# Patient Record
Sex: FEMALE | Race: WHITE | Employment: UNEMPLOYED | URBAN - METROPOLITAN AREA
[De-identification: names, ages, dates, MRNs, and addresses within clinical notes are randomized per-mention and may not be internally consistent; named-entity substitution may affect disease eponyms.]

---

## 2019-06-21 ENCOUNTER — OFFICE VISIT (OUTPATIENT)
Dept: INTERNAL MEDICINE CLINIC | Age: 44
End: 2019-06-21

## 2019-06-21 VITALS
DIASTOLIC BLOOD PRESSURE: 74 MMHG | SYSTOLIC BLOOD PRESSURE: 115 MMHG | BODY MASS INDEX: 23.7 KG/M2 | HEART RATE: 68 BPM | WEIGHT: 151 LBS | RESPIRATION RATE: 16 BRPM | TEMPERATURE: 98.2 F | HEIGHT: 67 IN | OXYGEN SATURATION: 100 %

## 2019-06-21 DIAGNOSIS — F41.9 ANXIETY AND DEPRESSION: ICD-10-CM

## 2019-06-21 DIAGNOSIS — F32.A ANXIETY AND DEPRESSION: ICD-10-CM

## 2019-06-21 DIAGNOSIS — L71.9 ACNE ROSACEA: ICD-10-CM

## 2019-06-21 DIAGNOSIS — Z85.820 HISTORY OF MELANOMA: ICD-10-CM

## 2019-06-21 DIAGNOSIS — Z00.00 ROUTINE ADULT HEALTH MAINTENANCE: Primary | ICD-10-CM

## 2019-06-21 RX ORDER — LAMOTRIGINE 100 MG/1
TABLET ORAL
Refills: 2 | COMMUNITY
Start: 2019-05-30 | End: 2020-08-28

## 2019-06-21 RX ORDER — CLINDAMYCIN PHOSPHATE 10 UG/ML
LOTION TOPICAL
Refills: 3 | COMMUNITY
Start: 2019-04-25 | End: 2022-09-20

## 2019-06-21 RX ORDER — TRETINOIN 0.25 MG/G
CREAM TOPICAL
Refills: 3 | COMMUNITY
Start: 2019-03-27 | End: 2022-09-20

## 2019-06-21 NOTE — PATIENT INSTRUCTIONS

## 2019-06-21 NOTE — PROGRESS NOTES
Identified pt with two pt identifiers(name and ). Reviewed record in preparation for visit and have obtained necessary documentation. Chief Complaint   Patient presents with   [address] Maintenance Due   Topic    DTaP/Tdap/Td series (1 - Tdap)    PAP AKA CERVICAL CYTOLOGY        Coordination of Care Questionnaire:   1) Have you been to an emergency room, urgent care, or hospitalized since your last visit? If yes, where when, and reason for visit? no       2. Have seen or consulted any other health care provider since your last visit? If yes, where when, and reason for visit? Yes, [facility]      3) Do you have an Advanced Directive/ Living Will in place? NO  If yes, do we have a copy on file NO  If no, would you like information NO    Patient is accompanied by self I have received verbal consent from Jessica Garland to discuss any/all medical information while they are present in the room.     Visit Vitals  /74 (BP 1 Location: Left arm, BP Patient Position: Sitting)   Pulse 68   Temp 98.2 °F (36.8 °C) (Oral)   Resp 16   Ht 5' 7\" (1.702 m)   Wt 151 lb (68.5 kg)   SpO2 100%   BMI 23.65 kg/m²

## 2019-06-21 NOTE — PROGRESS NOTES
Yoni Olmedo is a 40 y.o. female who presents for evaluation of cpe, npv. No recent pcp, but would follow with gyn annually. Doing well, no complaints. Has 3 kids at home, 15, 11,and 9.       ROS:  Constitutional: negative for fevers, chills, anorexia and weight loss  Eyes:   negative for visual disturbance and irritation  ENT:   negative for tinnitus,sore throat,nasal congestion,ear pain,hoarseness  Respiratory:  negative for cough, hemoptysis, dyspnea,wheezing  CV:   negative for chest pain, palpitations, lower extremity edema  GI:   negative for nausea, vomiting, diarrhea, abdominal pain,melena  Genitourinary: negative for frequency, dysuria and hematuria  Musculoskel: negative for myalgias, arthralgias, back pain, muscle weakness, joint pain  Neurological:  negative for headaches, dizziness, focal weakness, numbness  Psychiatric:     Negative for depression or anxiety      Past Medical History:   Diagnosis Date    Depression     Melanoma ([facility] [address]        Past Surgical History:   Procedure Laterality Date    VASCULAR SURGERY PROCEDURE UNLIST  2013       Family History   Problem Relation Age of Onset    Stroke Father     Heart Disease Father     Hypertension Father     Cancer Father         melanoma       Social History     Socioeconomic History    Marital status:      Spouse name: Not on file    Number of children: Not on file    Years of education: Not on file    Highest education level: Not on file   Occupational History    Not on file   Social Needs    Financial resource strain: Not on file    Food insecurity:     Worry: Not on file     Inability: Not on file    Transportation needs:     Medical: Not on file     Non-medical: Not on file   Tobacco Use    Smoking status: Never Smoker    Smokeless tobacco: Never Used   Substance and Sexual Activity    Alcohol use: Not Currently     Frequency: Never    Drug use: Never    Sexual activity: Yes     Partners: Male   Lifestyle    Physical activity:     Days per week: Not on file     Minutes per session: Not on file    Stress: Not on file   Relationships    Social connections:     Talks on phone: Not on file     Gets together: Not on file     Attends Mu-ism service: Not on file     Active member of club or organization: Not on file     Attends meetings of clubs or organizations: Not on file     Relationship status: Not on file    Intimate partner violence:     Fear of current or ex partner: Not on file     Emotionally abused: Not on file     Physically abused: Not on file     Forced sexual activity: Not on file   Other Topics Concern    Not on file   Social History Narrative    Not on file            Visit Vitals  /74 (BP 1 Location: Left arm, BP Patient Position: Sitting)   Pulse 68   Temp 98.2 °F (36.8 °C) (Oral)   Resp 16   Ht 5' 7\" (1.702 m)   Wt 151 lb (68.5 kg)   LMP 06/07/2019 (Exact Date)   SpO2 100%   BMI 23.65 kg/m²       Physical Examination:   General - Well appearing female  HEENT - PERRL, TM no erythema/opacification, normal nasal turbinates, no oropharyngeal erythema or exudate, MMM  Neck - supple, no bruits, no thyroidomegaly, no lymphadenopathy  Pulm - clear to auscultation bilaterally  Cardio - RRR, normal S1 S2, no murmur  Abd - soft, nontender, no masses, no HSM  Extrem - no edema, +2 distal pulses  Neuro-  No focal deficits, CN intact     Assessment/Plan:    1. Routine adult health maintenance--check cbc, cmp, flp, tsh, a1c, vit d  2. Hx melanoma--follows with derm annually  3. Acne rosacea--on tretinoin, prn clindamycin  4. Anxiety and depression--on lamictal now    [facility] rtc one year, sooner if labs abn.   No family hx or symptoms of colon cancer        Eve Mejía III, DO

## 2019-07-01 ENCOUNTER — TELEPHONE (OUTPATIENT)
Dept: INTERNAL MEDICINE CLINIC | Age: 44
End: 2019-07-01

## 2019-07-01 NOTE — TELEPHONE ENCOUNTER
Called, spoke to pt. Two identifiers confirmed. Contact information provided to pt for [facility]. Pt verbalized understanding of information discussed w/ no further questions at this time.

## 2019-07-02 LAB
25(OH)D3+25(OH)D2 SERPL-MCNC: 38.3 NG/ML (ref 30–100)
ALBUMIN SERPL-MCNC: 4.7 G/DL (ref 3.5–5.5)
ALBUMIN/GLOB SERPL: 2 {RATIO} (ref 1.2–2.2)
ALP SERPL-CCNC: 42 IU/L (ref 39–117)
ALT SERPL-CCNC: 13 IU/L (ref 0–32)
AST SERPL-CCNC: 16 IU/L (ref 0–40)
BASOPHILS # BLD AUTO: 0 X10E3/UL (ref 0–0.2)
BASOPHILS NFR BLD AUTO: 1 %
BILIRUB SERPL-MCNC: 0.8 MG/DL (ref 0–1.2)
BUN SERPL-MCNC: 13 MG/DL (ref 6–24)
BUN/CREAT SERPL: 13 (ref 9–23)
CALCIUM SERPL-MCNC: 9.4 MG/DL (ref 8.7–10.2)
CHLORIDE SERPL-SCNC: 104 MMOL/L (ref 96–106)
CHOLEST SERPL-MCNC: 188 MG/DL (ref 100–199)
CO2 SERPL-SCNC: 24 MMOL/L (ref 20–29)
CREAT SERPL-MCNC: 1.04 MG/DL (ref 0.57–1)
EOSINOPHIL # BLD AUTO: 0.2 X10E3/UL (ref 0–0.4)
EOSINOPHIL NFR BLD AUTO: 4 %
ERYTHROCYTE [DISTWIDTH] IN BLOOD BY AUTOMATED COUNT: 13.4 % (ref 12.3–15.4)
EST. AVERAGE GLUCOSE BLD GHB EST-MCNC: 105 MG/DL
GLOBULIN SER CALC-MCNC: 2.4 G/DL (ref 1.5–4.5)
GLUCOSE SERPL-MCNC: 86 MG/DL (ref 65–99)
HBA1C MFR BLD: 5.3 % (ref 4.8–5.6)
HCT VFR BLD AUTO: 42.4 % (ref 34–46.6)
HDLC SERPL-MCNC: 50 MG/DL
HGB BLD-MCNC: 14.2 G/DL (ref 11.1–15.9)
IMM GRANULOCYTES # BLD AUTO: 0 X10E3/UL (ref 0–0.1)
IMM GRANULOCYTES NFR BLD AUTO: 0 %
LDLC SERPL CALC-MCNC: 123 MG/DL (ref 0–99)
LYMPHOCYTES # BLD AUTO: 2.1 X10E3/UL (ref 0.7–3.1)
LYMPHOCYTES NFR BLD AUTO: 37 %
MCH RBC QN AUTO: 31.1 PG (ref 26.6–33)
MCHC RBC AUTO-ENTMCNC: 33.5 G/DL (ref 31.5–35.7)
MCV RBC AUTO: 93 FL (ref 79–97)
MONOCYTES # BLD AUTO: 0.4 X10E3/UL (ref 0.1–0.9)
MONOCYTES NFR BLD AUTO: 8 %
NEUTROPHILS # BLD AUTO: 2.9 X10E3/UL (ref 1.4–7)
NEUTROPHILS NFR BLD AUTO: 50 %
PLATELET # BLD AUTO: 215 X10E3/UL (ref 150–450)
POTASSIUM SERPL-SCNC: 5.3 MMOL/L (ref 3.5–5.2)
PROT SERPL-MCNC: 7.1 G/DL (ref 6–8.5)
RBC # BLD AUTO: 4.56 X10E6/UL (ref 3.77–5.28)
SODIUM SERPL-SCNC: 140 MMOL/L (ref 134–144)
TRIGL SERPL-MCNC: 77 MG/DL (ref 0–149)
TSH SERPL DL<=0.005 MIU/L-ACNC: 1.69 UIU/ML (ref 0.45–4.5)
VLDLC SERPL CALC-MCNC: 15 MG/DL (ref 5–40)
WBC # BLD AUTO: 5.7 X10E3/UL (ref 3.4–10.8)

## 2019-07-09 ENCOUNTER — TELEPHONE (OUTPATIENT)
Dept: INTERNAL MEDICINE CLINIC | Age: 44
End: 2019-07-09

## 2019-07-09 NOTE — TELEPHONE ENCOUNTER
Called, spoke to pt. Two identifiers confirmed. Pt notified of results/recommendations per Dr. Agueda Dow. Pt verbalized understanding of information discussed w/ no further questions at this time.

## 2019-07-09 NOTE — TELEPHONE ENCOUNTER
Pt would like blood test results that were performed on 07/01. Best contact number ([phone].        Message received & copied from [facility]

## 2020-08-18 ENCOUNTER — TELEPHONE (OUTPATIENT)
Dept: INTERNAL MEDICINE CLINIC | Age: 45
End: 2020-08-18

## 2020-08-18 NOTE — TELEPHONE ENCOUNTER
Called, spoke to pt  Received two pt identifiers  Pt informed appt scheduled with incorrect provider. Pt informed will send message to Dr. Sukh Crocker lead nurse to return call to get reschelued cor her annual CPE. Pt verbalizes understanding of the instructions and has no further questions at this time.

## 2020-08-25 NOTE — PROGRESS NOTES
HISTORY OF PRESENT ILLNESS  Ninfa Mcdowell is a 39 y.o. female. HPI     Pt is here to establish care. This is an established visit completed with telemedicine was completed with video assist  the patient acknowledges and agrees to this method of visitation doxyme    BP was 115/74 at [facility] was 151 lbs at lov  She exercises regularly  She avoid high caloric beverages    Ordered labs     She sees Dr. Ilda Lim (derm) for hx of melanoma  She had melanoma removed    She sees Dr. Yary Bell (psych) for anxiety/depression  She had apt this am 8/28/20  She was having a lot of panic attacks and then he added trintellix which has helped   She would like a referral for another psych because her current one isn't covered by insurance     She wonders if there is something wrong with her brain/hormones that could contribute to her depression because she has many meds and they haven't worked - will look on labs     PMHx: anxiety/depression, melanoma    FMHx: grandmother may have had colon cancer    PSHx: vascular surgery for varicose veins, uterine ablation, tonsillectomy     SHx: 3 kids, , at home, doesn't smoke or drink alcohol    PREVENTIVE:  Colonoscopy: will send referral due to family history  Dexa: not yet needed  Mammo: [address] 2019  Pap: [address] [facility] 2019, will call and find out if she's due  Tdap: > 10 years ago, due ordered  Pneumovax: not yet needed  Shingrix: not yet needed  Flu shot: fall 2019, due fall 2020   Eye exam: 1-2 years ago, due reminded  Lipids: 7/19 123  A1c: 7/19 5.3      Patient Active Problem List    Diagnosis Date Noted    History of melanoma 06/21/2019    Anxiety and depression 06/21/2019    Acne rosacea 06/21/2019     Current Outpatient Medications   Medication Sig Dispense Refill    diph,pertuss,acel,,tetanus vac,PF, (ADACEL) 2 Lf-(2.5-5-3-5 mcg)-5Lf/0.5 mL syrg vaccine 0.5 mL by IntraMUSCular route once for 1 dose.  0.5 mL 0    clindamycin (CLEOCIN T) 1 % lotion APPLY TO ACNE ON FACE AS NEEDED FOR FLARES  3    tretinoin (RETIN-A) 0.025 % topical cream APPLY TO ENTIRE FACE 3 TO 7 TIMES A WEEK AT BEDTIME  3    Trintellix 20 mg tablet TAKE 1 TABLET BY MOUTH EVERY DAY       Past Surgical History:   Procedure Laterality Date    HX GYN  2012    uterine ablation    HX TONSILLECTOMY      adenoidectomy    VASCULAR SURGERY PROCEDURE UNLIST  2013      Lab Results   Component Value Date/Time    WBC 5.7 07/01/2019 08:06 AM    HGB 14.2 07/01/2019 08:06 AM    Hemoglobin (POC) 12.9 11/22/2013 10:56 AM    HCT 42.4 07/01/2019 08:06 AM    Hematocrit (POC) 38 11/22/2013 10:56 AM    PLATELET 760 64/87/8208 08:06 AM    MCV 93 07/01/2019 08:06 AM     Lab Results   Component Value Date/Time    Cholesterol, total 188 07/01/2019 08:06 AM    HDL Cholesterol 50 07/01/2019 08:06 AM    LDL, calculated 123 (H) 07/01/2019 08:06 AM    Triglyceride 77 07/01/2019 08:06 AM     Lab Results   Component Value Date/Time    GFR est non-AA 66 07/01/2019 08:06 AM    GFRNA, POC >60 11/22/2013 10:56 AM    GFR est AA 76 07/01/2019 08:06 AM    GFRAA, POC >60 11/22/2013 10:56 AM    Creatinine 1.04 (H) 07/01/2019 08:06 AM    Creatinine (POC) 0.9 11/22/2013 10:56 AM    BUN 13 07/01/2019 08:06 AM    BUN (POC) 18 11/22/2013 10:56 AM    Sodium 140 07/01/2019 08:06 AM    Sodium (POC) 140 11/22/2013 10:56 AM    Potassium 5.3 (H) 07/01/2019 08:06 AM    Potassium (POC) 4.5 11/22/2013 10:56 AM    Chloride 104 07/01/2019 08:06 AM    Chloride (POC) 105 11/22/2013 10:56 AM    CO2 24 07/01/2019 08:06 AM        Review of Systems   Respiratory: Negative for shortness of breath. Cardiovascular: Negative for chest pain. Physical Exam  Constitutional:       General: She is not in acute distress. Appearance: Normal appearance. She is not ill-appearing, toxic-appearing or diaphoretic. HENT:      Head: Normocephalic and atraumatic. Eyes:      General:         Right eye: No discharge. Left eye: No discharge. Conjunctiva/sclera: Conjunctivae normal.   Pulmonary:      Effort: No respiratory distress. Neurological:      General: No focal deficit present. Mental Status: She is alert and oriented to person, place, and time. Mental status is at baseline. Gait: Gait normal.   Psychiatric:         Mood and Affect: Mood normal.         Behavior: Behavior normal.         ASSESSMENT and PLAN    ICD-10-CM ICD-9-CM    1. History of melanoma        Z85.820 V10.82 REFERRAL TO GASTROENTEROLOGY   Up-to-date with dermatology   LIPID PANEL      METABOLIC PANEL, COMPREHENSIVE      CBC W/O DIFF      HEMOGLOBIN A1C WITH EAG      TSH 3RD GENERATION   2. Anxiety and depression  F41.9 300.00 REFERRAL TO GASTROENTEROLOGY    F32.9 311 LIPID PANEL   In the process of getting her medications adjusted through psychiatry will defer to psychiatry   METABOLIC PANEL, COMPREHENSIVE      CBC W/O DIFF      HEMOGLOBIN A1C WITH EAG      TSH 3RD GENERATION   3. Acne rosacea  L71.9 695.3 REFERRAL TO GASTROENTEROLOGY   Sees Derm   LIPID PANEL      METABOLIC PANEL, COMPREHENSIVE      CBC W/O DIFF      HEMOGLOBIN A1C WITH EAG      TSH 3RD GENERATION   4. Physical exam  Z00.00 V70.9 REFERRAL TO GASTROENTEROLOGY   Patient is 45 colonoscopy likely due due to grandmother with colon cancer due for blood work ordered flu shot in the fall order Tdap for her pelvic and mammogram are about due she will schedule eye exam every other year   LIPID PANEL      METABOLIC PANEL, COMPREHENSIVE      CBC W/O DIFF      HEMOGLOBIN A1C WITH EAG      TSH 3RD GENERATION      [facility] AND       Depression screen reviewed and positive (1) follows with psychiatry to saw him today       Scribed by Mike Spivey of [address] [place] [address] [address], as dictated by Dr. Janes Alejo. Current diagnosis and concerns discussed with pt at length. Pt understands risks and benefits or current treatment plan and medications, and accepts the treatment and medication with any possible risks.  Pt asks appropriate questions, which were answered. Pt was instructed to call with any concerns or problems. I have reviewed the note documented by the scribe. The services provided are my own. The documentation is accurate     Yakov Nixa, who was evaluated through a synchronous (real-time) audio-video encounter, and/or her healthcare decision maker, is aware that it is a billable service, with coverage as determined by her insurance carrier. She provided verbal consent to proceed: Yes, and patient identification was verified. It was conducted pursuant to the emergency declaration under the [address] [place] [address], [address] [place] [address] authority and the [employer] Act. A caregiver was present when appropriate. Ability to conduct physical exam was limited. I was at home. The patient was at home.

## 2020-08-28 ENCOUNTER — VIRTUAL VISIT (OUTPATIENT)
Dept: INTERNAL MEDICINE CLINIC | Age: 45
End: 2020-08-28
Payer: COMMERCIAL

## 2020-08-28 DIAGNOSIS — F41.9 ANXIETY AND DEPRESSION: ICD-10-CM

## 2020-08-28 DIAGNOSIS — Z85.820 HISTORY OF MELANOMA: Primary | ICD-10-CM

## 2020-08-28 DIAGNOSIS — L71.9 ACNE ROSACEA: ICD-10-CM

## 2020-08-28 DIAGNOSIS — F32.A ANXIETY AND DEPRESSION: ICD-10-CM

## 2020-08-28 DIAGNOSIS — Z00.00 PHYSICAL EXAM: ICD-10-CM

## 2020-08-28 PROCEDURE — 99213 OFFICE O/P EST LOW 20 MIN: CPT | Performed by: INTERNAL MEDICINE

## 2020-08-28 RX ORDER — VORTIOXETINE 20 MG/1
TABLET, FILM COATED ORAL
COMMUNITY
Start: 2020-07-30 | End: 2022-09-20

## 2020-08-28 NOTE — PATIENT INSTRUCTIONS
Medicare Wellness Visit, Female The best way to live healthy is to have a lifestyle where you eat a well-balanced diet, exercise regularly, limit alcohol use, and quit all forms of tobacco/nicotine, if applicable. Regular preventive services are another way to keep healthy. Preventive services (vaccines, screening tests, monitoring & exams) can help personalize your care plan, which helps you manage your own care. Screening tests can find health problems at the earliest stages, when they are easiest to treat. [facility] follows the current, evidence-based guidelines published by the [facility] Garret Pickering ([facility]) when recommending preventive services for our patients. Because we follow these guidelines, sometimes recommendations change over time as research supports it. (For example, mammograms used to be recommended annually. Even though Medicare will still pay for an annual mammogram, the newer guidelines recommend a mammogram every two years for women of average risk). Of course, you and your doctor may decide to screen more often for some diseases, based on your risk and your co-morbidities (chronic disease you are already diagnosed with). Preventive services for you include: - Medicare offers their members a free annual wellness visit, which is time for you and your primary care provider to discuss and plan for your preventive service needs. Take advantage of this benefit every year! 
-All adults over the age of 72 should receive the recommended pneumonia vaccines. Current USPSTF guidelines recommend a series of two vaccines for the best pneumonia protection.  
-All adults should have a flu vaccine yearly and a tetanus vaccine every 10 years.  
-All adults age 48 and older should receive the shingles vaccines (series of two vaccines). -All adults age 38-68 who are overweight should have a diabetes screening test once every three years. -All adults born between [birth year] and 1965 should be screened once for Hepatitis C. 
-Other screening tests and preventive services for persons with diabetes include: an eye exam to screen for diabetic retinopathy, a kidney function test, a foot exam, and stricter control over your cholesterol.  
-Cardiovascular screening for adults with routine risk involves an electrocardiogram (ECG) at intervals determined by your doctor.  
-Colorectal cancer screenings should be done for adults age 54-65 with no increased risk factors for colorectal cancer. There are a number of acceptable methods of screening for this type of cancer. Each test has its own benefits and drawbacks. Discuss with your doctor what is most appropriate for you during your annual wellness visit. The different tests include: colonoscopy (considered the best screening method), a fecal occult blood test, a fecal DNA test, and sigmoidoscopy. 
 
-A bone mass density test is recommended when a woman turns 65 to screen for osteoporosis. This test is only recommended one time, as a screening. Some providers will use this same test as a disease monitoring tool if you already have osteoporosis. -Breast cancer screenings are recommended every other year for women of normal risk, age 54-69. 
-Cervical cancer screenings for women over age 72 are only recommended with certain risk factors. Here is a list of your current Health Maintenance items (your personalized list of preventive services) with a due date: 
Health Maintenance Due Topic Date Due  
 DTaP/Tdap/Td  (1 - Tdap) 04/30/1996  Pap Test  04/30/1996

## 2020-09-16 ENCOUNTER — TELEPHONE (OUTPATIENT)
Dept: INTERNAL MEDICINE CLINIC | Age: 45
End: 2020-09-16

## 2020-09-16 NOTE — TELEPHONE ENCOUNTER
Caller's first and last name: pt.   Reason for call: Pt. states she received her lab order from the practice that had her personal information on it. Pt. states on the form her address, insurance , and spouse who's the insurance policy rodríguez address is wrong on the form. Pt. would like to know if her VV. appt. was billed to her insurance correctly.  Pt.  would like to know if she should just cross out the mistake on the order or will another order for labs be mailed to her again.    Callback required yes/no and why: yes   Best contact number(s): [phone]   Details to clarify the request: [facility] was on file but she is with Sarah Dennis *updated pt.'s insurance*

## 2020-09-18 NOTE — TELEPHONE ENCOUNTER
----- Message from Diana Muro sent at 9/18/2020  3:31 PM EDT -----  Regarding: Mayte Tenorio/telephone  Patient returned a phone call from the practice. Pt had a physical with the doctor and some of the information is incorrect.  Best contact number is [phone]   Message copied/pasted from [facility]

## 2020-09-23 NOTE — TELEPHONE ENCOUNTER
Called out and spoke to pt. Two pt identifiers confirmed. Informed pt per PSR LH that pt's updated insurance is up to date and her previous ([employer]) insurance is not listed. Pt verbalized understanding of information discussed w/ no further questions at this time.

## 2020-09-23 NOTE — TELEPHONE ENCOUNTER
Called out and spoke to pt. Two pt identifiers confirmed. Pt states that her 8/28/20 lab orders and referral stated on them that the pt has Verlene Champagne" which is her old insurance. Pt states on the referral, it shows her  as \"primary\" and it has an old address. Pt has [employer]. Pt asking for verification for she does not to be billed incorrectly. Informed pt LPN RR will verify pt's insurance info.

## 2020-10-16 DIAGNOSIS — R79.89 LFT ELEVATION: Primary | ICD-10-CM

## 2020-10-16 LAB
ALBUMIN SERPL-MCNC: 4.7 G/DL (ref 3.8–4.8)
ALBUMIN/GLOB SERPL: 2 {RATIO} (ref 1.2–2.2)
ALP SERPL-CCNC: 56 IU/L (ref 39–117)
ALT SERPL-CCNC: 53 IU/L (ref 0–32)
AST SERPL-CCNC: 40 IU/L (ref 0–40)
BILIRUB SERPL-MCNC: 0.8 MG/DL (ref 0–1.2)
BUN SERPL-MCNC: 19 MG/DL (ref 6–24)
BUN/CREAT SERPL: 22 (ref 9–23)
CALCIUM SERPL-MCNC: 9.4 MG/DL (ref 8.7–10.2)
CHLORIDE SERPL-SCNC: 102 MMOL/L (ref 96–106)
CHOLEST SERPL-MCNC: 235 MG/DL (ref 100–199)
CO2 SERPL-SCNC: 23 MMOL/L (ref 20–29)
CREAT SERPL-MCNC: 0.87 MG/DL (ref 0.57–1)
ERYTHROCYTE [DISTWIDTH] IN BLOOD BY AUTOMATED COUNT: 12.1 % (ref 11.7–15.4)
EST. AVERAGE GLUCOSE BLD GHB EST-MCNC: 105 MG/DL
FSH SERPL-ACNC: 2.8 MIU/ML
GLOBULIN SER CALC-MCNC: 2.3 G/DL (ref 1.5–4.5)
GLUCOSE SERPL-MCNC: 86 MG/DL (ref 65–99)
HBA1C MFR BLD: 5.3 % (ref 4.8–5.6)
HCT VFR BLD AUTO: 40.5 % (ref 34–46.6)
HDLC SERPL-MCNC: 63 MG/DL
HGB BLD-MCNC: 13.9 G/DL (ref 11.1–15.9)
LDLC SERPL CALC-MCNC: 160 MG/DL (ref 0–99)
LH SERPL-ACNC: 2.8 MIU/ML
MCH RBC QN AUTO: 31 PG (ref 26.6–33)
MCHC RBC AUTO-ENTMCNC: 34.3 G/DL (ref 31.5–35.7)
MCV RBC AUTO: 90 FL (ref 79–97)
PLATELET # BLD AUTO: 215 X10E3/UL (ref 150–450)
POTASSIUM SERPL-SCNC: 4.7 MMOL/L (ref 3.5–5.2)
PROT SERPL-MCNC: 7 G/DL (ref 6–8.5)
RBC # BLD AUTO: 4.49 X10E6/UL (ref 3.77–5.28)
SODIUM SERPL-SCNC: 138 MMOL/L (ref 134–144)
TRIGL SERPL-MCNC: 69 MG/DL (ref 0–149)
TSH SERPL DL<=0.005 MIU/L-ACNC: 1.91 UIU/ML (ref 0.45–4.5)
VLDLC SERPL CALC-MCNC: 12 MG/DL (ref 5–40)
WBC # BLD AUTO: 6.7 X10E3/UL (ref 3.4–10.8)

## 2020-10-16 NOTE — PROGRESS NOTES
Called out and spoke to pt. Two pt identifiers confirmed. Pt informed per Dr. José Miguel Castorena the lipids are up and to work on diet. Pt informed per Dr. José Miguel Castorena of LFT's are up--may be fatty liver. Pt informed per Dr. José Miguel Castorena to repeat hepatic fxn panel and acute hepatitis panel in 4 weeks--Labs ordered and mailed to pt. Pt informed rest of labs stable. Pt verbalized understanding of information discussed w/ no further questions at this time.

## 2020-10-16 NOTE — PROGRESS NOTES
Please call patient back about results  Lipids up work on diet    lft up --may be fatty liver,     Repeat hepatic fxn panel and acute hepatitis panel in 4 weeks    Rest fine

## 2020-12-11 LAB
ALBUMIN SERPL-MCNC: 4.8 G/DL (ref 3.8–4.8)
ALP SERPL-CCNC: 53 IU/L (ref 39–117)
ALT SERPL-CCNC: 33 IU/L (ref 0–32)
AST SERPL-CCNC: 28 IU/L (ref 0–40)
BILIRUB DIRECT SERPL-MCNC: 0.14 MG/DL (ref 0–0.4)
BILIRUB SERPL-MCNC: 0.6 MG/DL (ref 0–1.2)
HAV IGM SERPL QL IA: NEGATIVE
HBV CORE IGM SERPL QL IA: NEGATIVE
HBV SURFACE AG SERPL QL IA: NEGATIVE
HCV AB S/CO SERPL IA: <0.1 S/CO RATIO (ref 0–0.9)
PROT SERPL-MCNC: 6.9 G/DL (ref 6–8.5)

## 2022-03-18 PROBLEM — L71.9 ACNE ROSACEA: Status: ACTIVE | Noted: 2019-06-21

## 2022-03-19 PROBLEM — Z85.820 HISTORY OF MELANOMA: Status: ACTIVE | Noted: 2019-06-21

## 2022-03-20 PROBLEM — F32.A ANXIETY AND DEPRESSION: Status: ACTIVE | Noted: 2019-06-21

## 2022-03-20 PROBLEM — F41.9 ANXIETY AND DEPRESSION: Status: ACTIVE | Noted: 2019-06-21

## 2022-09-01 ENCOUNTER — TELEPHONE (OUTPATIENT)
Dept: INTERNAL MEDICINE CLINIC | Age: 47
End: 2022-09-01

## 2022-09-01 NOTE — TELEPHONE ENCOUNTER
----- Message from InVitaeisai 2 sent at 9/1/2022 11:23 AM EDT -----  Subject: Appointment Request    Reason for Call: Established Patient Appointment needed: Semi-Routine Skin   Problem    QUESTIONS    Reason for appointment request? No appointments available during search     Additional Information for Provider?  Patients request appointment for   bumps on her elbows.   ---------------------------------------------------------------------------  --------------  Cari JONES  [phone]; OK to leave message on voicemail  ---------------------------------------------------------------------------  --------------  SCRIPT ANSWERS  COVID Screen: Maisha Lambert

## 2022-09-01 NOTE — TELEPHONE ENCOUNTER
Called and offered to schedule patient for a virtual visit on 9/9/22. Patient declined and requested in office. Patient has been scheduled for the next available appointment on 9/20/22.

## 2022-09-16 NOTE — PROGRESS NOTES
HISTORY OF PRESENT ILLNESS  Haritha Love is a 52 y.o. female. HPI  Last here vv 8/28/20. Here for routine care. Pt c/o bumps on both elbows. Notes she noticed the one on the L elbow over 1 year ago and the one on the R elbow sometime after that. On exam: Subcutaneous lipomas L and R elbows  Discussed these are likely benign, but if they get bigger she can get them removed    Pt notes an episode while driving back from her daughter's soccer game when she suddenly felt very nauseous and sweaty. She came home and laid down and felt okay after that. She also notes being fatigued and having a headache over the weekend. States she had low iron the last time she tried to give blood. Will check iron levels today. Reminded pt to complete colo. BP today controlled     Wt today is 209 lbs, up 48 lbs since lov  States she is unsure how this happened, she has eaten less and exercised more  Discussed [employer], [address] wt/l [facility], also discussed that cymbalta has weight gain as a side effect     Reviewed labs  Ordered labs   Discussed she may need a chol med soon  Discussed elevated liver test  She states she only drinks occasionally.  Discussed this could have been due to weight gain     She sees Dr. Stefania Bains (derm) for hx of melanoma  Last visit 2/22, follows annually  She had melanoma removed     She sees Dr. Marce Workman (psych) for anxiety/depression  Last virtual visit 6/22, f/U every 6 months  Now taking cymbalta 30 mg   No longer taking trintellex  Has an rx for xanax, but doesn't use it much (once/month at the most)     PREVENTIVE:  Colonoscopy: sent referral due to family history, still due, reminded  Dexa: not yet needed  Mammo: [address] [facility] 3/22, will get report  Pap: [address] [facility] 3/2/22  Tdap: > 10 years ago, will get today 9/20/22  Pneumovax: not yet needed  Shingrix: not yet needed  Flu shot: fall 2021  Eye exam: 1-2 years ago, due reminded again  Hep C: 12/20 neg  Lipids: 8/20   A1c: 8/20 5.3  COVID: 2 doses Pfizer, last one 5/21       Patient Active Problem List    Diagnosis Date Noted    History of melanoma 06/21/2019    Anxiety and depression 06/21/2019    Acne rosacea 06/21/2019     Current Outpatient Medications   Medication Sig Dispense Refill    Trintellix 20 mg tablet TAKE 1 TABLET BY MOUTH EVERY DAY      clindamycin (CLEOCIN T) 1 % lotion APPLY TO ACNE ON FACE AS NEEDED FOR FLARES  3    tretinoin (RETIN-A) 0.025 % topical cream APPLY TO ENTIRE FACE 3 TO 7 TIMES A WEEK AT BEDTIME  3     Past Surgical History:   Procedure Laterality Date    HX GYN  2012    uterine ablation    HX TONSILLECTOMY      adenoidectomy    VASCULAR SURGERY PROCEDURE UNLIST  2013      Lab Results   Component Value Date/Time    WBC 6.7 10/15/2020 09:32 AM    HGB 13.9 10/15/2020 09:32 AM    Hemoglobin (POC) 12.9 11/22/2013 10:56 AM    HCT 40.5 10/15/2020 09:32 AM    Hematocrit (POC) 38 11/22/2013 10:56 AM    PLATELET 901 80/26/8603 09:32 AM    MCV 90 10/15/2020 09:32 AM     Lab Results   Component Value Date/Time    Cholesterol, total 235 (H) 10/15/2020 09:32 AM    HDL Cholesterol 63 10/15/2020 09:32 AM    LDL, calculated 160 (H) 10/15/2020 09:32 AM    LDL, calculated 123 (H) 07/01/2019 08:06 AM    Triglyceride 69 10/15/2020 09:32 AM     Lab Results   Component Value Date/Time    GFR est non-AA 81 10/15/2020 09:32 AM    GFRNA, POC >60 11/22/2013 10:56 AM    GFR est AA 93 10/15/2020 09:32 AM    GFRAA, POC >60 11/22/2013 10:56 AM    Creatinine 0.87 10/15/2020 09:32 AM    Creatinine (POC) 0.9 11/22/2013 10:56 AM    BUN 19 10/15/2020 09:32 AM    BUN (POC) 18 11/22/2013 10:56 AM    Sodium 138 10/15/2020 09:32 AM    Sodium (POC) 140 11/22/2013 10:56 AM    Potassium 4.7 10/15/2020 09:32 AM    Potassium (POC) 4.5 11/22/2013 10:56 AM    Chloride 102 10/15/2020 09:32 AM    Chloride (POC) 105 11/22/2013 10:56 AM    CO2 23 10/15/2020 09:32 AM        Review of Systems   Respiratory:  Negative for shortness of breath.     Cardiovascular:  Negative for chest pain. Physical Exam  Constitutional:       General: She is not in acute distress. Appearance: She is not ill-appearing, toxic-appearing or diaphoretic. HENT:      Head: Normocephalic and atraumatic. Right Ear: External ear normal.      Left Ear: External ear normal.   Eyes:      General:         Right eye: No discharge. Left eye: No discharge. Conjunctiva/sclera: Conjunctivae normal.      Pupils: Pupils are equal, round, and reactive to light. Neck:      Vascular: No carotid bruit. Cardiovascular:      Rate and Rhythm: Normal rate and regular rhythm. Heart sounds: No murmur heard. No friction rub. No gallop. Pulmonary:      Effort: No respiratory distress. Breath sounds: Normal breath sounds. No wheezing or rales. Chest:      Chest wall: No tenderness. Musculoskeletal:         General: Normal range of motion. Cervical back: Normal.   Skin:     General: Skin is warm and dry. Comments: Subcutaneous lipomas L and R elbows   Neurological:      Mental Status: She is oriented to person, place, and time. Mental status is at baseline. Coordination: Coordination normal.      Gait: Gait normal.   Psychiatric:         Mood and Affect: Mood normal.         Behavior: Behavior normal.       ASSESSMENT and PLAN    ICD-10-CM ICD-9-CM    1.  Physical exam  Z00.00 V70.9 REFERRAL TO GASTROENTEROLOGY      LIPID PANEL   Tdap today    Will get flu shot later in the season    Overdue for colonoscopy which I discussed    Pelvic and mammogram up-to-date will get reports for review    COVID vaccines completed 2 shots    She reports some low ferritin when giving blood we will check basic labs    Reports an episode of lightheadedness and like she was dehydrated that day we will keep an eye on it    Subcutaneous nodules appear to be benign lipomas discussed if they change in size start enlarging would evaluate further   METABOLIC PANEL, COMPREHENSIVE      HEMOGLOBIN A1C WITH EAG   Discussed diet and weight loss discussed weight loss programs to include weight watchers [address] weight and wellness   TSH 3RD GENERATION      CBC W/O DIFF      AVRIL COMPREHENSIVE PANEL      ACTIN (SMOOTH MUSCLE) ANTIBODY      REFERRAL TO OBSTETRICS AND GYNECOLOGY      FERRITIN      2. Anxiety and depression  F41.9 300.00 LIPID PANEL    F32. A 416 METABOLIC PANEL, COMPREHENSIVE      HEMOGLOBIN A1C WITH EAG   Controlled on Cymbalta follows with psychiatry   TSH 3RD GENERATION      CBC W/O DIFF      AVRIL COMPREHENSIVE PANEL      ACTIN (SMOOTH MUSCLE) ANTIBODY      REFERRAL TO OBSTETRICS AND GYNECOLOGY      3. History of melanoma  Z85.820 V10.82 LIPID PANEL      METABOLIC PANEL, COMPREHENSIVE   Follows routinely with dermatology   HEMOGLOBIN A1C WITH EAG      TSH 3RD GENERATION      CBC W/O DIFF      AVRIL COMPREHENSIVE PANEL      ACTIN (SMOOTH MUSCLE) ANTIBODY      REFERRAL TO OBSTETRICS AND GYNECOLOGY      4. LFT elevation  R79.89 790.6 LIPID PANEL      METABOLIC PANEL, COMPREHENSIVE      HEMOGLOBIN A1C WITH EAG   Patient with LFT elevation last check negative hepatitis panel likely related to fatty liver    Discussed    Work on weight loss    Check labs if still elevated will need ultrasound liver   TSH 3RD GENERATION      CBC W/O DIFF      AVRIL COMPREHENSIVE PANEL      ACTIN (SMOOTH MUSCLE) ANTIBODY      REFERRAL TO OBSTETRICS AND GYNECOLOGY        Depression screen reviewed and negative. Scribed by Arianna Long, as dictated by Dr. Saritha Acharya. Current diagnosis and concerns discussed with pt at length. Pt understands risks and benefits or current treatment plan and medications, and accepts the treatment and medication with any possible risks. Pt asks appropriate questions, which were answered. Pt was instructed to call with any concerns or problems. I have reviewed the note documented by the scribe. The services provided are my own.  The documentation is accurate.

## 2022-09-20 ENCOUNTER — OFFICE VISIT (OUTPATIENT)
Dept: INTERNAL MEDICINE CLINIC | Age: 47
End: 2022-09-20

## 2022-09-20 VITALS
WEIGHT: 209.4 LBS | HEIGHT: 67 IN | HEART RATE: 80 BPM | RESPIRATION RATE: 16 BRPM | OXYGEN SATURATION: 98 % | DIASTOLIC BLOOD PRESSURE: 80 MMHG | TEMPERATURE: 99 F | SYSTOLIC BLOOD PRESSURE: 113 MMHG | BODY MASS INDEX: 32.87 KG/M2

## 2022-09-20 DIAGNOSIS — F32.A ANXIETY AND DEPRESSION: ICD-10-CM

## 2022-09-20 DIAGNOSIS — Z85.820 HISTORY OF MELANOMA: ICD-10-CM

## 2022-09-20 DIAGNOSIS — Z23 ENCOUNTER FOR IMMUNIZATION: ICD-10-CM

## 2022-09-20 DIAGNOSIS — Z00.00 PHYSICAL EXAM: Primary | ICD-10-CM

## 2022-09-20 DIAGNOSIS — F41.9 ANXIETY AND DEPRESSION: ICD-10-CM

## 2022-09-20 DIAGNOSIS — R79.89 LFT ELEVATION: ICD-10-CM

## 2022-09-20 LAB
COMMENT, HOLDF: NORMAL
SAMPLES BEING HELD,HOLD: NORMAL

## 2022-09-20 PROCEDURE — 99396 PREV VISIT EST AGE 40-64: CPT | Performed by: INTERNAL MEDICINE

## 2022-09-20 PROCEDURE — 90715 TDAP VACCINE 7 YRS/> IM: CPT | Performed by: INTERNAL MEDICINE

## 2022-09-20 PROCEDURE — 90471 IMMUNIZATION ADMIN: CPT | Performed by: INTERNAL MEDICINE

## 2022-09-20 RX ORDER — DULOXETIN HYDROCHLORIDE 30 MG/1
30 CAPSULE, DELAYED RELEASE ORAL DAILY
COMMUNITY

## 2022-09-20 RX ORDER — ALPRAZOLAM 0.25 MG/1
TABLET ORAL
COMMUNITY

## 2022-09-20 NOTE — LETTER
9/22/2022 10:16 AM    Ms. Krystal Bhakta  [address] [facility] [id number]    Dear Care Provider    Please fax us the most recent mammogram and PAP results so that we may update the patient's records for continuity of care. Our fax number: [phone].     Patient:   Krystal Bhakta  1975          Sincerely,      Lázaro Spivey MD

## 2022-09-20 NOTE — LETTER
9/22/2022 10:21 AM    Ms. Versa Severin  [address] [id number]              Sincerely,      Sofi Vasquez MD

## 2022-09-22 DIAGNOSIS — R79.89 LFT ELEVATION: ICD-10-CM

## 2022-09-22 DIAGNOSIS — E78.5 ELEVATED LIPIDS: Primary | ICD-10-CM

## 2022-09-22 LAB
ALBUMIN SERPL-MCNC: 4.3 G/DL (ref 3.5–5)
ALBUMIN/GLOB SERPL: 1.4 {RATIO} (ref 1.1–2.2)
ALP SERPL-CCNC: 65 U/L (ref 45–117)
ALT SERPL-CCNC: 45 U/L (ref 12–78)
ANION GAP SERPL CALC-SCNC: 6 MMOL/L (ref 5–15)
AST SERPL-CCNC: 22 U/L (ref 15–37)
BILIRUB SERPL-MCNC: 0.6 MG/DL (ref 0.2–1)
BUN SERPL-MCNC: 14 MG/DL (ref 6–20)
BUN/CREAT SERPL: 15 (ref 12–20)
CALCIUM SERPL-MCNC: 8.7 MG/DL (ref 8.5–10.1)
CENTROMERE B AB SER-ACNC: <0.2 AI (ref 0–0.9)
CHLORIDE SERPL-SCNC: 104 MMOL/L (ref 97–108)
CHOLEST SERPL-MCNC: 255 MG/DL
CHROMATIN AB SERPL-ACNC: <0.2 AI (ref 0–0.9)
CO2 SERPL-SCNC: 28 MMOL/L (ref 21–32)
CREAT SERPL-MCNC: 0.95 MG/DL (ref 0.55–1.02)
DSDNA AB SER-ACNC: 1 IU/ML (ref 0–9)
ENA JO1 AB SER-ACNC: <0.2 AI (ref 0–0.9)
ENA RNP AB SER-ACNC: <0.2 AI (ref 0–0.9)
ENA SCL70 AB SER-ACNC: <0.2 AI (ref 0–0.9)
ENA SM AB SER-ACNC: <0.2 AI (ref 0–0.9)
ENA SS-A AB SER-ACNC: <0.2 AI (ref 0–0.9)
ENA SS-B AB SER-ACNC: <0.2 AI (ref 0–0.9)
ERYTHROCYTE [DISTWIDTH] IN BLOOD BY AUTOMATED COUNT: 12.7 % (ref 11.5–14.5)
EST. AVERAGE GLUCOSE BLD GHB EST-MCNC: 117 MG/DL
FERRITIN SERPL-MCNC: 18 NG/ML (ref 26–388)
GLOBULIN SER CALC-MCNC: 3.1 G/DL (ref 2–4)
GLUCOSE SERPL-MCNC: 92 MG/DL (ref 65–100)
HBA1C MFR BLD: 5.7 % (ref 4–5.6)
HCT VFR BLD AUTO: 44.7 % (ref 35–47)
HDLC SERPL-MCNC: 49 MG/DL
HDLC SERPL: 5.2 {RATIO} (ref 0–5)
HGB BLD-MCNC: 13.8 G/DL (ref 11.5–16)
LDLC SERPL CALC-MCNC: 170.2 MG/DL (ref 0–100)
MCH RBC QN AUTO: 29.6 PG (ref 26–34)
MCHC RBC AUTO-ENTMCNC: 30.9 G/DL (ref 30–36.5)
MCV RBC AUTO: 95.9 FL (ref 80–99)
NRBC # BLD: 0 K/UL (ref 0–0.01)
NRBC BLD-RTO: 0 PER 100 WBC
PLATELET # BLD AUTO: 257 K/UL (ref 150–400)
PMV BLD AUTO: 11.5 FL (ref 8.9–12.9)
POTASSIUM SERPL-SCNC: 4.4 MMOL/L (ref 3.5–5.1)
PROT SERPL-MCNC: 7.4 G/DL (ref 6.4–8.2)
RBC # BLD AUTO: 4.66 M/UL (ref 3.8–5.2)
SEE BELOW, 164869: NORMAL
SMA IGG SER-ACNC: 10 UNITS (ref 0–19)
SODIUM SERPL-SCNC: 138 MMOL/L (ref 136–145)
TRIGL SERPL-MCNC: 179 MG/DL (ref ?–150)
TSH SERPL DL<=0.05 MIU/L-ACNC: 2.61 UIU/ML (ref 0.36–3.74)
VLDLC SERPL CALC-MCNC: 35.8 MG/DL
WBC # BLD AUTO: 8 K/UL (ref 3.6–11)

## 2022-09-22 RX ORDER — ROSUVASTATIN CALCIUM 5 MG/1
5 TABLET, COATED ORAL
Qty: 90 TABLET | Refills: 0 | Status: SHIPPED | OUTPATIENT
Start: 2022-09-22

## 2022-09-22 NOTE — TELEPHONE ENCOUNTER
Future Appointments:  No future appointments. Last Appointment With Me:  9/20/2022     Requested Prescriptions     Pending Prescriptions Disp Refills    rosuvastatin (CRESTOR) 5 mg tablet 90 Tablet 0     Sig: Take 1 Tablet by mouth nightly.

## 2022-09-22 NOTE — PROGRESS NOTES
Called, spoke to pt  Received two pt identifiers   Pt informed per Dr. Chrissy Kelly is normal, no anemia, iron/ferritn a bit on lower side--can take otc iron 325mg every other day   Pt informed per Dr. Saritha Long Still needs colonoscopy due to family history and routine screening  Pt informed per Dr. Meggan Walker very high, suggest crestor 5mg daily in addition to wt loss (of note this is teratogenic--avoid pregnancy/stop med if became pregnant)---pended  Pt states had an ablation  Pt informed per Dr. Saritha Long  Repeat lipids and ck in 6 weeks---ordered  Pt informed per Dr. Saritha Long mild prediabetes on bloodwork,work on diet/weight loss to improve this, work on avoiding simple carbohydrates (\"whites\"--white bread, white rice, white pasta, etc.. ) to improve further, no medications needed for now will monitor bloodwork   Pt verbalizes understanding of the instructions and has no further questions at this time.

## 2022-09-22 NOTE — PROGRESS NOTES
Please call patient back about results  Cbc nl, no anemia, iron/ferritn a bit on lower side--can take otc iron 325mg every other day   Still needs colo d/t fhx and routine screening  Lipids very high, suggest crestor 5mg daily in addition to wt loss (of note this is teratogenic--avoid pregnancy/stop med if became pregnant)  Repeat lipids and ck in 6 weeks  the patient has mild prediabetes on bloodwork, have them work on diet/weight loss to improve this, work on avoiding simple carbohydrates (\"whites\"--white bread, white rice, white pasta, etc.. ) to improve further, no medications needed for now will monitor bloodwork

## 2022-12-19 RX ORDER — ROSUVASTATIN CALCIUM 5 MG/1
5 TABLET, COATED ORAL
Qty: 90 TABLET | Refills: 0 | Status: SHIPPED | OUTPATIENT
Start: 2022-12-19

## 2023-03-13 RX ORDER — ROSUVASTATIN CALCIUM 5 MG/1
5 TABLET, COATED ORAL
Qty: 90 TABLET | Refills: 0 | Status: SHIPPED | OUTPATIENT
Start: 2023-03-13

## 2023-05-31 LAB — MAMMOGRAPHY, EXTERNAL: NORMAL

## 2023-06-19 RX ORDER — ROSUVASTATIN CALCIUM 5 MG/1
TABLET, COATED ORAL NIGHTLY
Qty: 30 TABLET | Refills: 0 | Status: SHIPPED | OUTPATIENT
Start: 2023-06-19 | End: 2023-07-20 | Stop reason: SDUPTHER

## 2023-07-20 RX ORDER — ROSUVASTATIN CALCIUM 5 MG/1
5 TABLET, COATED ORAL NIGHTLY
Qty: 30 TABLET | Refills: 0 | Status: SHIPPED | OUTPATIENT
Start: 2023-07-20

## 2023-07-20 NOTE — TELEPHONE ENCOUNTER
PCP: Santiago Last MD    Last appt:   9/20/2022    Future Appointments   Date Time Provider [address]   9/18/2023  1:00 PM Meli Love MD [facility] BS AMB       Requested Prescriptions     Pending Prescriptions Disp Refills    rosuvastatin (CRESTOR) 5 MG tablet 30 tablet 0     Sig: Take 1 tablet by mouth nightly

## 2023-08-17 RX ORDER — ROSUVASTATIN CALCIUM 5 MG/1
5 TABLET, COATED ORAL NIGHTLY
Qty: 90 TABLET | Refills: 0 | Status: SHIPPED | OUTPATIENT
Start: 2023-08-17

## 2023-08-17 NOTE — TELEPHONE ENCOUNTER
PCP: Makenna Briseno MD    Last appt: 9/20/2022   Future Appointments   Date Time Provider [address]   9/18/2023  1:00 PM Camacho Churchill MD [facility] BS AMB       Requested Prescriptions     Pending Prescriptions Disp Refills    rosuvastatin (CRESTOR) 5 MG tablet 90 tablet 0     Sig: Take 1 tablet by mouth nightly

## 2024-11-06 ENCOUNTER — OFFICE VISIT (OUTPATIENT)
Age: 49
End: 2024-11-06
Payer: COMMERCIAL

## 2024-11-06 VITALS
OXYGEN SATURATION: 100 % | SYSTOLIC BLOOD PRESSURE: 96 MMHG | HEART RATE: 76 BPM | TEMPERATURE: 97.6 F | BODY MASS INDEX: 24.8 KG/M2 | RESPIRATION RATE: 18 BRPM | WEIGHT: 158 LBS | DIASTOLIC BLOOD PRESSURE: 66 MMHG | HEIGHT: 67 IN

## 2024-11-06 DIAGNOSIS — F32.A ANXIETY AND DEPRESSION: ICD-10-CM

## 2024-11-06 DIAGNOSIS — F41.9 ANXIETY AND DEPRESSION: ICD-10-CM

## 2024-11-06 DIAGNOSIS — Z00.00 PHYSICAL EXAM: Primary | ICD-10-CM

## 2024-11-06 DIAGNOSIS — R05.2 SUBACUTE COUGH: ICD-10-CM

## 2024-11-06 DIAGNOSIS — Z85.820 HISTORY OF MELANOMA: ICD-10-CM

## 2024-11-06 PROCEDURE — G8484 FLU IMMUNIZE NO ADMIN: HCPCS | Performed by: INTERNAL MEDICINE

## 2024-11-06 PROCEDURE — 99396 PREV VISIT EST AGE 40-64: CPT | Performed by: INTERNAL MEDICINE

## 2024-11-06 RX ORDER — DULOXETIN HYDROCHLORIDE 60 MG/1
60 CAPSULE, DELAYED RELEASE ORAL EVERY MORNING
COMMUNITY
Start: 2024-09-18

## 2024-11-06 RX ORDER — TIRZEPATIDE 15 MG/.5ML
INJECTION, SOLUTION SUBCUTANEOUS
COMMUNITY
Start: 2024-10-10

## 2024-11-06 RX ORDER — PROGESTERONE 200 MG/1
200 CAPSULE ORAL DAILY
COMMUNITY
Start: 2024-08-26

## 2024-11-06 RX ORDER — TOPIRAMATE 50 MG/1
50 TABLET, FILM COATED ORAL EVERY EVENING
COMMUNITY
Start: 2024-08-26

## 2024-11-06 SDOH — ECONOMIC STABILITY: INCOME INSECURITY: HOW HARD IS IT FOR YOU TO PAY FOR THE VERY BASICS LIKE FOOD, HOUSING, MEDICAL CARE, AND HEATING?: NOT HARD AT ALL

## 2024-11-06 SDOH — ECONOMIC STABILITY: FOOD INSECURITY: WITHIN THE PAST 12 MONTHS, YOU WORRIED THAT YOUR FOOD WOULD RUN OUT BEFORE YOU GOT MONEY TO BUY MORE.: NEVER TRUE

## 2024-11-06 SDOH — ECONOMIC STABILITY: FOOD INSECURITY: WITHIN THE PAST 12 MONTHS, THE FOOD YOU BOUGHT JUST DIDN'T LAST AND YOU DIDN'T HAVE MONEY TO GET MORE.: NEVER TRUE

## 2024-11-06 ASSESSMENT — PATIENT HEALTH QUESTIONNAIRE - PHQ9
2. FEELING DOWN, DEPRESSED OR HOPELESS: NOT AT ALL
SUM OF ALL RESPONSES TO PHQ QUESTIONS 1-9: 0
1. LITTLE INTEREST OR PLEASURE IN DOING THINGS: NOT AT ALL
SUM OF ALL RESPONSES TO PHQ9 QUESTIONS 1 & 2: 0

## 2024-11-06 NOTE — PROGRESS NOTES
\"Have you been to the ER, urgent care clinic since your last visit?  Hospitalized since your last visit?\"    NO    “Have you seen or consulted any other health care providers outside our system since your last visit?”    NO     “Have you had a pap smear?”    NO    No cervical cancer screening on file            
  Up-to-date with dermatology   4. Subacute cough  External Referral To ENT   Patient reports a cough only when talking significantly discussed Flonase in addition to Zyrtec change Zyrtec to plain Zyrtec no D ENT if not improving no chest x-ray just yet this cough is only with speaking    I have reviewed the note documented by the scribe.  The services provided are my own.  The documentation is accurate     Depression screen reviewed and negative.  Scribed by Tatiana Linares, as dictated by Dr. Lenora Quiroz.    Current diagnosis and concerns discussed with pt at length. Pt understands risks and benefits or current treatment plan and medications, and accepts the treatment and medication with any possible risks. Pt asks appropriate questions, which were answered. Pt was instructed to call with any concerns or problems.    I have reviewed the note documented by the scribe. The services provided are my own. The documentation is accurate.

## 2025-07-07 ENCOUNTER — HOSPITAL ENCOUNTER (OUTPATIENT)
Facility: HOSPITAL | Age: 50
Discharge: HOME | End: 2025-07-10
Payer: COMMERCIAL

## 2025-07-07 ENCOUNTER — PATIENT MESSAGE (OUTPATIENT)
Age: 50
End: 2025-07-07

## 2025-07-07 ENCOUNTER — RESULTS FOLLOW-UP (OUTPATIENT)
Age: 50
End: 2025-07-07

## 2025-07-07 ENCOUNTER — OFFICE VISIT (OUTPATIENT)
Age: 50
End: 2025-07-07
Payer: COMMERCIAL

## 2025-07-07 VITALS
BODY MASS INDEX: 24.86 KG/M2 | HEART RATE: 100 BPM | WEIGHT: 158.4 LBS | OXYGEN SATURATION: 100 % | HEIGHT: 67 IN | SYSTOLIC BLOOD PRESSURE: 98 MMHG | RESPIRATION RATE: 15 BRPM | DIASTOLIC BLOOD PRESSURE: 65 MMHG

## 2025-07-07 DIAGNOSIS — J40 BRONCHITIS: Primary | ICD-10-CM

## 2025-07-07 DIAGNOSIS — R09.82 PND (POST-NASAL DRIP): ICD-10-CM

## 2025-07-07 PROCEDURE — 4004F PT TOBACCO SCREEN RCVD TLK: CPT | Performed by: INTERNAL MEDICINE

## 2025-07-07 PROCEDURE — G8427 DOCREV CUR MEDS BY ELIG CLIN: HCPCS | Performed by: INTERNAL MEDICINE

## 2025-07-07 PROCEDURE — G8420 CALC BMI NORM PARAMETERS: HCPCS | Performed by: INTERNAL MEDICINE

## 2025-07-07 PROCEDURE — 3017F COLORECTAL CA SCREEN DOC REV: CPT | Performed by: INTERNAL MEDICINE

## 2025-07-07 PROCEDURE — 99213 OFFICE O/P EST LOW 20 MIN: CPT | Performed by: INTERNAL MEDICINE

## 2025-07-07 PROCEDURE — 71046 X-RAY EXAM CHEST 2 VIEWS: CPT

## 2025-07-07 RX ORDER — BENZONATATE 200 MG/1
CAPSULE ORAL
COMMUNITY
Start: 2025-06-27

## 2025-07-07 RX ORDER — DROSPIRENONE AND ETHINYL ESTRADIOL 0.02-3(28)
KIT ORAL
COMMUNITY
Start: 2025-06-03

## 2025-07-07 RX ORDER — LANOLIN ALCOHOL/MO/W.PET/CERES
1000 CREAM (GRAM) TOPICAL DAILY
COMMUNITY

## 2025-07-07 RX ORDER — BUDESONIDE AND FORMOTEROL FUMARATE DIHYDRATE 160; 4.5 UG/1; UG/1
2 AEROSOL RESPIRATORY (INHALATION) 2 TIMES DAILY
Qty: 10.2 G | Refills: 3 | Status: SHIPPED | OUTPATIENT
Start: 2025-07-07

## 2025-07-07 RX ORDER — ALBUTEROL SULFATE 90 UG/1
INHALANT RESPIRATORY (INHALATION)
COMMUNITY
Start: 2025-06-13

## 2025-07-07 RX ORDER — CETIRIZINE HYDROCHLORIDE 10 MG/1
10 TABLET ORAL DAILY
COMMUNITY

## 2025-07-07 RX ORDER — PREDNISONE 20 MG/1
TABLET ORAL
Qty: 12 TABLET | Refills: 0 | Status: SHIPPED | OUTPATIENT
Start: 2025-07-07

## 2025-07-07 SDOH — ECONOMIC STABILITY: FOOD INSECURITY: WITHIN THE PAST 12 MONTHS, THE FOOD YOU BOUGHT JUST DIDN'T LAST AND YOU DIDN'T HAVE MONEY TO GET MORE.: NEVER TRUE

## 2025-07-07 SDOH — ECONOMIC STABILITY: FOOD INSECURITY: WITHIN THE PAST 12 MONTHS, YOU WORRIED THAT YOUR FOOD WOULD RUN OUT BEFORE YOU GOT MONEY TO BUY MORE.: NEVER TRUE

## 2025-07-07 ASSESSMENT — PATIENT HEALTH QUESTIONNAIRE - PHQ9
SUM OF ALL RESPONSES TO PHQ QUESTIONS 1-9: 0
SUM OF ALL RESPONSES TO PHQ QUESTIONS 1-9: 0
1. LITTLE INTEREST OR PLEASURE IN DOING THINGS: NOT AT ALL
2. FEELING DOWN, DEPRESSED OR HOPELESS: NOT AT ALL
SUM OF ALL RESPONSES TO PHQ QUESTIONS 1-9: 0
SUM OF ALL RESPONSES TO PHQ QUESTIONS 1-9: 0

## 2025-07-07 ASSESSMENT — ENCOUNTER SYMPTOMS
SORE THROAT: 1
SHORTNESS OF BREATH: 0
COUGH: 1
VOICE CHANGE: 1

## 2025-07-07 NOTE — PROGRESS NOTES
HISTORY OF PRESENT ILLNESS  Natalie Bailey is a 50 y.o. female.  HPI    Last here 11/6/24. Here for acute care.    Pt reports ongoing cough x 1 month. Sx include fatigue, cough, hoarseness, facial numbness. She had a bad sore throat initially, which improved and settled into a cough. She gets bad coughing fits where she struggles to breathe. Denies fever, chills. She went to  about a month ago, was given zpak that did not help. She returned 6/27/25, was given medrol dosepak, inhaler, and cough medicine that did not help.     Note I saw her 11/24 for a bad cough, she takes zyrtec and flonase QD. She went to ENT 11/22/24, did laryngoscopy, saw esophageal reflux, normal vocal cords, given omeprazole x 6 weeks that did not help.    Ordered CXR. Rx'd prednisone, symbicort BID prn.    Patient Active Problem List   Diagnosis    Acne rosacea    History of melanoma    Anxiety and depression     Current Outpatient Medications   Medication Sig Dispense Refill    ZEPBOUND 15 MG/0.5ML SOAJ subCUTAneous auto-injector pen INJECT 15 MILLIGRAM(S) SUBCUTANEOUS AS DIRECTED      topiramate (TOPAMAX) 50 MG tablet Take 1 tablet by mouth every evening      progesterone (PROMETRIUM) 200 MG CAPS capsule Take 1 capsule by mouth daily      DULoxetine (CYMBALTA) 60 MG extended release capsule Take 1 capsule by mouth every morning      ALPRAZolam (XANAX) 0.25 MG tablet Take by mouth.       No current facility-administered medications for this visit.     Past Surgical History:   Procedure Laterality Date    GYN  2012    uterine ablation    TONSILLECTOMY      adenoidectomy    VASCULAR SURGERY  2013         Lab Results   Component Value Date    WBC 8.0 09/20/2022    HGB 13.8 09/20/2022    HCT 44.7 09/20/2022    MCV 95.9 09/20/2022     09/20/2022     Lab Results   Component Value Date    CHOL 255 (H) 09/20/2022    TRIG 179 (H) 09/20/2022    HDL 49 09/20/2022     Lab Results   Component Value Date    CREATININE 0.95 09/20/2022    BUN 14